# Patient Record
Sex: MALE | Race: WHITE | URBAN - METROPOLITAN AREA
[De-identification: names, ages, dates, MRNs, and addresses within clinical notes are randomized per-mention and may not be internally consistent; named-entity substitution may affect disease eponyms.]

---

## 2023-05-01 ENCOUNTER — OFFICE VISIT (OUTPATIENT)
Dept: URBAN - METROPOLITAN AREA CLINIC 43 | Facility: CLINIC | Age: 58
End: 2023-05-01
Payer: COMMERCIAL

## 2023-05-01 DIAGNOSIS — T15.02XA FOREIGN BODY IN CORNEA, LEFT EYE, INITIAL ENCOUNTER: Primary | ICD-10-CM

## 2023-05-01 PROCEDURE — 99203 OFFICE O/P NEW LOW 30 MIN: CPT | Performed by: OPTOMETRIST

## 2023-05-01 PROCEDURE — 65222 REMOVE FOREIGN BODY FROM EYE: CPT | Performed by: OPTOMETRIST

## 2023-05-01 NOTE — IMPRESSION/PLAN
Impression: Foreign body in cornea, left eye, initial encounter: T15.02xA.  Plan: removed with proparacaine and spud, epi defect remained, start oflox TID OS for 4-5 days, use extensive PF ATs, f/u in 2-3 days to check k edema